# Patient Record
Sex: FEMALE | Race: WHITE
[De-identification: names, ages, dates, MRNs, and addresses within clinical notes are randomized per-mention and may not be internally consistent; named-entity substitution may affect disease eponyms.]

---

## 2019-01-28 ENCOUNTER — HOSPITAL ENCOUNTER (EMERGENCY)
Dept: HOSPITAL 89 - ER | Age: 29
Discharge: HOME | End: 2019-01-28
Payer: MEDICAID

## 2019-01-28 VITALS — SYSTOLIC BLOOD PRESSURE: 123 MMHG | DIASTOLIC BLOOD PRESSURE: 105 MMHG

## 2019-01-28 VITALS — BODY MASS INDEX: 30.11 KG/M2

## 2019-01-28 DIAGNOSIS — K08.89: Primary | ICD-10-CM

## 2019-01-28 PROCEDURE — 99283 EMERGENCY DEPT VISIT LOW MDM: CPT

## 2019-01-28 NOTE — ER REPORT
History and Physical


Time Seen By MD:  02:36


Hx. of Stated Complaint:  


PATIENT HAS TOOTH PAIN ON HER UPPER RIGHT SIDE OF HER MOUTH; STATES THAT IT HAS 


BEEN GOING ON FOR A WHILE, TONIGHT IT HAS GOTTEN WOSE


HPI/ROS


CHIEF COMPLAINT: Dental pain





HISTORY OF PRESENT ILLNESS: This is a 29-year-old female. She has chronic right 


lower dental pain. She has a third molar coming in crooked that's impacted and 


has a broken second molar. She always has pain there but is much worse tonight. 


They cannot afford a dentist. She's been using over-the-counter pain medicines 


without relief tonight. No drainage or purulent taste from the tooth.


Allergies:  


Coded Allergies:  


     No Known Drug Allergies (Unverified , 11/26/16)


Home Meds


Active Scripts


Amoxicillin (AMOXICILLIN) 500 Mg Capsule, 1 CAP PO Q8H, #30 CAPSULE 0 Refills


   Prov:BRODIE GOMES MD         1/28/19


Hydrocodone Bit/Acetaminophen (HYDROCODON-ACETAMINOPHEN 5-325) 1 Each Tablet, 1 


EACH PO Q4H PRN for PAIN, #8 TAB 0 Refills


   Prov:BRODIE GOMES MD         1/28/19


Estradiol (ESTRADIOL) 1 Mg Tablet, 1 MG PO DAILY for 14 Days, #14 TAB 0 Refills


   Prov:ELADIO INGRAM MD         11/1/17


Ibuprofen (IBUPROFEN) 800 Mg Tablet, 800 MG PO Q8H PRN for PAIN, #20 TAB 0 


Refills


   Prov:ELADIO INGRAM MD         11/1/17


Reported Medications


Medroxyprogesterone Acetate (DEPO-PROVERA) 150 Mg/1 Ml Vial, 150 MG IM [id number],


VIAL


   10/26/17


Butalb/Acetaminophen/Caffeine (FIORICET -40) 1 Each Capsule, 1-2 EACH PO 


PRN PRN for MIGRAINE, CAPSULE


   10/26/17


Prenatal Vit #76/Iron,Carb/FA (Pnv 29-1 Tablet) 1 Each Tablet, 1 TAB DAILY


   11/24/16


Reviewed Nurses Notes:  Yes


Hx Smoking:  No


Smoking Status:  Never Smoker


Exposure to Second Hand Smoke?:  Yes ("SOMETIMES")


Hx Substance Use Disorder:  No


Hx Alcohol Use:  No


Constitutional





Vital Sign - Last 24 Hours








 1/28/19





 02:34


 


Temp 97.9


 


Pulse 94


 


Resp 18


 


B/P (MAP) 123/105


 


Pulse Ox 91


 


O2 Delivery Room Air








Physical Exam


Gen.: Alert, no distress.


ENT: Patient does have an impacted crooked third molar coming in the right 


lower. Small quarter of the tooth of the second molar broken off. No redness of 


the gums. No purulent drainage or abscess formation noted


Neck: No lymphadenopathy noted





Medical Decision Making


ED Course/Re-evaluation


ED Course


Recommend the patient follow up with a dentist. Did provide some Lortab tonight 


for pain as well as a small prescription, she will also continue using 


ibuprofen. Also gave amoxicillin.


Decision to Disposition Date:  Jan 28, 2019


Decision to Disposition Time:  02:40





Depart


Departure


Latest Vital Signs





Vital Signs








  Date Time  Temp Pulse Resp B/P (MAP) Pulse Ox O2 Delivery O2 Flow Rate FiO2


 


1/28/19 02:34 97.9 94 18 123/105 91 Room Air  








Impression:  


   Primary Impression:  


   Pain, dental


Condition:  Improved


Disposition:  HOME OR SELF-CARE


New Scripts


Amoxicillin (AMOXICILLIN) 500 Mg Capsule


1 CAP PO Q8H, #30 CAPSULE 0 Refills


   Prov: BRODIE GOMES MD         1/28/19 


Hydrocodone Bit/Acetaminophen (HYDROCODON-ACETAMINOPHEN 5-325) 1 Each Tablet


1 EACH PO Q4H PRN for PAIN, #8 TAB 0 Refills


   Prov: BRODIE GOMES MD         1/28/19


Patient Instructions:  Toothache (ED)





Additional Instructions:  


Ibuprofen 200mg over the counter tablets, take 4 tablets three times a day with 


food.


Lortab 5/325, one every 4 hours as needed for pain.


Amoxicillin 500 mg 3 times a day. Follow up with a dentist for definitive care.











BRODIE GOMES MD             Jan 28, 2019 02:41

## 2019-07-15 ENCOUNTER — HOSPITAL ENCOUNTER (OUTPATIENT)
Dept: HOSPITAL 89 - RAD | Age: 29
End: 2019-07-15
Attending: PHYSICIAN ASSISTANT
Payer: COMMERCIAL

## 2019-07-15 VITALS — BODY MASS INDEX: 30.11 KG/M2

## 2019-07-15 DIAGNOSIS — R05: Primary | ICD-10-CM

## 2019-07-15 PROCEDURE — 71046 X-RAY EXAM CHEST 2 VIEWS: CPT

## 2019-07-15 NOTE — RADIOLOGY IMAGING REPORT
FACILITY: [facility] 

 

PATIENT NAME: Bella Marie

: 1990

MR: [id number]

V: [id number]

EXAM DATE: 

ORDERING PHYSICIAN: WINDY ORTIZ

TECHNOLOGIST: 

 

Location: [facility]

Patient: Frank April

: 1990

MRN: [id number]

Visit/Account:[id number]

Date of Sevice:  7/15/2019

 

ACCESSION #: [id number]

 

CHEST PA LAT

 

COMPARISONS: None.

 

ADDITIONAL PERTINENT HISTORY: Cough for 5 days with fever.

 

FINDINGS:

Cardiomediastinal silhouette:  Negative.

 

Pulmonary vasculature:  Negative.

 

Lung fields:  Patchy infiltrate involving the right upper lobe.

 

Pleural spaces: Negative.

 

Osseous structures:  Negative.

 

Surrounding soft tissues:  Negative.

 

 

IMPRESSION: Patchy infiltrate involving the right upper lobe.

 

Report Dictated By: Odell Curran MD at 7/15/2019 6:18 PM

 

Report E-Signed By: Odell Curran MD  at 7/15/2019 6:19 PM

 

WSN:[id number]